# Patient Record
Sex: FEMALE | Race: WHITE | Employment: PART TIME | URBAN - METROPOLITAN AREA
[De-identification: names, ages, dates, MRNs, and addresses within clinical notes are randomized per-mention and may not be internally consistent; named-entity substitution may affect disease eponyms.]

---

## 2022-12-07 ENCOUNTER — HOSPITAL ENCOUNTER (EMERGENCY)
Age: 22
Discharge: HOME | End: 2022-12-08
Payer: COMMERCIAL

## 2022-12-07 VITALS
SYSTOLIC BLOOD PRESSURE: 131 MMHG | WEIGHT: 148 LBS | TEMPERATURE: 98.2 F | OXYGEN SATURATION: 96 % | HEART RATE: 108 BPM | RESPIRATION RATE: 18 BRPM | DIASTOLIC BLOOD PRESSURE: 83 MMHG

## 2022-12-07 DIAGNOSIS — J45.909 UNCOMPLICATED ASTHMA, UNSPECIFIED ASTHMA SEVERITY, UNSPECIFIED WHETHER PERSISTENT: ICD-10-CM

## 2022-12-07 DIAGNOSIS — J10.1 INFLUENZA A: Primary | ICD-10-CM

## 2022-12-07 LAB
INFLUENZA A BY PCR: POSITIVE
INFLUENZA B BY PCR: NEGATIVE
SARS-COV-2, NAAT: NOT DETECTED

## 2022-12-07 PROCEDURE — 87635 SARS-COV-2 COVID-19 AMP PRB: CPT

## 2022-12-07 PROCEDURE — 96372 THER/PROPH/DIAG INJ SC/IM: CPT

## 2022-12-07 PROCEDURE — 6370000000 HC RX 637 (ALT 250 FOR IP)

## 2022-12-07 PROCEDURE — 6360000002 HC RX W HCPCS

## 2022-12-07 PROCEDURE — 99284 EMERGENCY DEPT VISIT MOD MDM: CPT

## 2022-12-07 PROCEDURE — 87502 INFLUENZA DNA AMP PROBE: CPT

## 2022-12-07 RX ORDER — ACETAMINOPHEN 500 MG
500 TABLET ORAL EVERY 6 HOURS PRN
Qty: 120 TABLET | Refills: 3 | Status: SHIPPED | OUTPATIENT
Start: 2022-12-07

## 2022-12-07 RX ORDER — ACETAMINOPHEN 325 MG/1
650 TABLET ORAL ONCE
Status: COMPLETED | OUTPATIENT
Start: 2022-12-07 | End: 2022-12-07

## 2022-12-07 RX ORDER — NAPROXEN 500 MG/1
500 TABLET ORAL 2 TIMES DAILY WITH MEALS
Qty: 60 TABLET | Refills: 5 | Status: SHIPPED | OUTPATIENT
Start: 2022-12-07

## 2022-12-07 RX ORDER — GUAIFENESIN 600 MG/1
600 TABLET, EXTENDED RELEASE ORAL 2 TIMES DAILY
Status: DISCONTINUED | OUTPATIENT
Start: 2022-12-07 | End: 2022-12-08 | Stop reason: HOSPADM

## 2022-12-07 RX ORDER — GUAIFENESIN 600 MG/1
600 TABLET, EXTENDED RELEASE ORAL 2 TIMES DAILY
Qty: 30 TABLET | Refills: 0 | Status: SHIPPED | OUTPATIENT
Start: 2022-12-07 | End: 2022-12-22

## 2022-12-07 RX ORDER — METHYLPREDNISOLONE SODIUM SUCCINATE 125 MG/2ML
60 INJECTION, POWDER, LYOPHILIZED, FOR SOLUTION INTRAMUSCULAR; INTRAVENOUS ONCE
Status: COMPLETED | OUTPATIENT
Start: 2022-12-07 | End: 2022-12-07

## 2022-12-07 RX ORDER — BENZONATATE 100 MG/1
100 CAPSULE ORAL 3 TIMES DAILY PRN
Qty: 30 CAPSULE | Refills: 0 | Status: SHIPPED | OUTPATIENT
Start: 2022-12-07 | End: 2022-12-14

## 2022-12-07 RX ORDER — BENZONATATE 100 MG/1
100 CAPSULE ORAL ONCE
Status: COMPLETED | OUTPATIENT
Start: 2022-12-07 | End: 2022-12-07

## 2022-12-07 RX ORDER — NAPROXEN 500 MG/1
500 TABLET ORAL ONCE
Status: COMPLETED | OUTPATIENT
Start: 2022-12-07 | End: 2022-12-07

## 2022-12-07 RX ADMIN — METHYLPREDNISOLONE SODIUM SUCCINATE 60 MG: 125 INJECTION, POWDER, FOR SOLUTION INTRAMUSCULAR; INTRAVENOUS at 22:53

## 2022-12-07 RX ADMIN — NAPROXEN 500 MG: 500 TABLET ORAL at 22:57

## 2022-12-07 RX ADMIN — BENZONATATE 100 MG: 100 CAPSULE ORAL at 22:49

## 2022-12-07 RX ADMIN — ACETAMINOPHEN 650 MG: 325 TABLET ORAL at 22:49

## 2022-12-07 RX ADMIN — GUAIFENESIN 600 MG: 600 TABLET ORAL at 22:49

## 2022-12-07 ASSESSMENT — ENCOUNTER SYMPTOMS
SHORTNESS OF BREATH: 0
VOMITING: 0
ABDOMINAL PAIN: 0
DIARRHEA: 0
NAUSEA: 0
SINUS PRESSURE: 1
PHOTOPHOBIA: 0
COUGH: 0
SINUS PAIN: 1

## 2022-12-08 NOTE — ED PROVIDER NOTES
[address] [facility] ED  eMERGENCY dEPARTMENT eNCOUnter      Pt Name: Naye Farris  MRN: [id number]  [place] 2000  Date of evaluation: 12/7/2022  Provider: KARTHIK Jenkins        HISTORY OF PRESENT ILLNESS    Naye Farris is a 25 y.o. female per chart review has ah/o asthma. Presents to the emergency room with several hour history of sinus pressure, sinus congestion, rhinorrhea, cough, headache. Patient was recently exposed to a family member with influenza. Denies shortness of breath or wheezing. Denies increased need for inhaler use. Denies recent fever or chills. No nausea vomiting or diarrhea. She states she is completing Keflex for a UTI at this time and has felt her urinary symptoms are improving. Still has intermittent flank pain but remains tolerating the antibiotic well. Also reports ongoing brain fog for several months and states she has been referred to neurology in the past but has not yet had the chance to follow-up. No new or focal deficits reported. REVIEW OF SYSTEMS       Review of Systems   Constitutional:  Negative for chills and fever. HENT:  Positive for sinus pressure and sinus pain. Negative for congestion. Eyes:  Negative for photophobia. Respiratory:  Negative for cough and shortness of breath. Cardiovascular:  Negative for chest pain. Gastrointestinal:  Negative for abdominal pain, diarrhea, nausea and vomiting. Genitourinary:  Negative for difficulty urinating. Musculoskeletal:  Negative for myalgias. Neurological:  Negative for headaches. Psychiatric/Behavioral:  Negative for confusion. Except as noted above the remainder of the review of systems was reviewed and negative. PAST MEDICAL HISTORY   No past medical history on file. SURGICAL HISTORY     No past surgical history on file.       CURRENT MEDICATIONS       Discharge Medication List as of 12/7/2022 11:41 PM          ALLERGIES     Patient has no known allergies. FAMILY HISTORY     No family history on file. SOCIAL HISTORY       Social History     Socioeconomic History    Marital status: Single         PHYSICAL EXAM        ED Triage Vitals   BP Temp Temp Source Heart Rate Resp SpO2 Height Weight   12/07/22 2111 12/07/22 2109 12/07/22 2109 12/07/22 2109 12/07/22 2109 12/07/22 2109 -- 12/07/22 2109   131/83 98.2 °F (36.8 °C) Temporal (!) 108 18 96 %  148 lb (67.1 kg)       Physical Exam  Constitutional:       General: She is not in acute distress. Appearance: Normal appearance. She is not ill-appearing, toxic-appearing or diaphoretic. HENT:      Head: Normocephalic and atraumatic. Right Ear: External ear normal.      Left Ear: External ear normal.      Nose: Congestion and rhinorrhea present. Mouth/Throat:      Mouth: Mucous membranes are moist.      Pharynx: Oropharynx is clear. No oropharyngeal exudate or posterior oropharyngeal erythema. Eyes:      Extraocular Movements: Extraocular movements intact. Conjunctiva/sclera: Conjunctivae normal.      Pupils: Pupils are equal, round, and reactive to light. Cardiovascular:      Rate and Rhythm: Normal rate and regular rhythm. Pulmonary:      Effort: Pulmonary effort is normal. No respiratory distress. Breath sounds: Normal breath sounds. No stridor. No wheezing, rhonchi or rales. Comments: L CTA B  Chest:      Chest wall: No tenderness. Abdominal:      General: Bowel sounds are normal. There is no distension. Palpations: Abdomen is soft. There is no mass. Tenderness: There is no abdominal tenderness. There is no right CVA tenderness, left CVA tenderness, guarding or rebound. Hernia: No hernia is present. Comments: Soft nontender nonrigid   Musculoskeletal:         General: Normal range of motion. Cervical back: Normal range of motion. Right lower leg: No edema. Left lower leg: No edema. Skin:     General: Skin is warm.       Capillary Refill: Capillary refill takes less than 2 seconds. Coloration: Skin is not pale. Findings: No lesion or rash. Neurological:      Mental Status: She is alert and oriented to person, place, and time. Psychiatric:         Mood and Affect: Mood normal.         Behavior: Behavior normal.         LABS:  Labs Reviewed   RAPID INFLUENZA A/B ANTIGENS - Abnormal; Notable for the following components:       Result Value    Influenza A by PCR POSITIVE (*)     All other components within normal limits   COVID-19, RAPID         MDM:   Vitals:    Vitals:    12/07/22 2109 12/07/22 2111   BP:  131/83   Pulse: (!) 108    Resp: 18    Temp: 98.2 °F (36.8 °C)    TempSrc: Temporal    SpO2: 96%    Weight: 148 lb (67.1 kg)        25year-old patient presents emergency department with concern for upper respiratory infectious complaints. Recent exposure to influenza. She is influenza A positive in the ED. She is denying shortness of breath or asthma symptoms. On exam her lungs are completely clear with good air movement to all fields. She is not in any distress having any increased work of breathing or hypoxic. Not consistent with asthma exacerbation. Given symptom management in the emergency department. She is also discussing that she was recently diagnosed with a UTI and wondering if she should continue taking her Keflex as her symptoms have been feeling better and she is educated to continue. Also reporting ongoing several month history of intermittent \"brain fog\" issues that she has been referred to neurology for and strongly encouraged to keep this follow-up or return for repeat evaluation for specific worsening symptoms. She is agreeable to discharge states she is feeling well given symptom management for home. CRITICAL CARE TIME   Total CriticalCare time was 0 minutes, excluding separately reportable procedures.   There was a high probability of clinically significant/life threatening deterioration in the patient's condition which required my urgent intervention. PROCEDURES:  Unlessotherwise noted below, none      Procedures      FINAL IMPRESSION      1. Influenza A    2.  Uncomplicated asthma, unspecified asthma severity, unspecified whether persistent          DISPOSITION/PLAN   DISPOSITION Decision To Discharge 12/07/2022 11:40:28 PM          KARTHIK Kim (electronically signed)  Attending Emergency Physician          Michael Flaherty, [address]  12/08/22 [id number]

## 2024-07-16 ENCOUNTER — APPOINTMENT (OUTPATIENT)
Dept: OBSTETRICS AND GYNECOLOGY | Facility: CLINIC | Age: 24
End: 2024-07-16
Payer: COMMERCIAL

## 2024-07-16 ENCOUNTER — LAB (OUTPATIENT)
Dept: LAB | Facility: LAB | Age: 24
End: 2024-07-16
Payer: COMMERCIAL

## 2024-07-16 VITALS
SYSTOLIC BLOOD PRESSURE: 129 MMHG | BODY MASS INDEX: 33.14 KG/M2 | WEIGHT: 168.8 LBS | HEIGHT: 60 IN | DIASTOLIC BLOOD PRESSURE: 84 MMHG

## 2024-07-16 DIAGNOSIS — N92.0 UNUSUALLY FREQUENT MENSES: ICD-10-CM

## 2024-07-16 DIAGNOSIS — Z11.3 SCREENING EXAMINATION FOR STI: ICD-10-CM

## 2024-07-16 DIAGNOSIS — Z01.419 WELL WOMAN EXAM WITH ROUTINE GYNECOLOGICAL EXAM: Primary | ICD-10-CM

## 2024-07-16 DIAGNOSIS — R53.83 OTHER FATIGUE: ICD-10-CM

## 2024-07-16 DIAGNOSIS — R63.5 WEIGHT GAIN: ICD-10-CM

## 2024-07-16 DIAGNOSIS — N94.10 DYSPAREUNIA, FEMALE: ICD-10-CM

## 2024-07-16 LAB
HCG UR QL IA.RAPID: NEGATIVE
TSH SERPL-ACNC: 1.8 MIU/L (ref 0.44–3.98)

## 2024-07-16 PROCEDURE — 83001 ASSAY OF GONADOTROPIN (FSH): CPT

## 2024-07-16 PROCEDURE — 87340 HEPATITIS B SURFACE AG IA: CPT

## 2024-07-16 PROCEDURE — 84146 ASSAY OF PROLACTIN: CPT

## 2024-07-16 PROCEDURE — 1036F TOBACCO NON-USER: CPT

## 2024-07-16 PROCEDURE — 86803 HEPATITIS C AB TEST: CPT

## 2024-07-16 PROCEDURE — 87491 CHLMYD TRACH DNA AMP PROBE: CPT

## 2024-07-16 PROCEDURE — 36415 COLL VENOUS BLD VENIPUNCTURE: CPT

## 2024-07-16 PROCEDURE — 87389 HIV-1 AG W/HIV-1&-2 AB AG IA: CPT

## 2024-07-16 PROCEDURE — 99395 PREV VISIT EST AGE 18-39: CPT

## 2024-07-16 PROCEDURE — 84443 ASSAY THYROID STIM HORMONE: CPT

## 2024-07-16 PROCEDURE — 81025 URINE PREGNANCY TEST: CPT

## 2024-07-16 PROCEDURE — 86780 TREPONEMA PALLIDUM: CPT

## 2024-07-16 PROCEDURE — 84402 ASSAY OF FREE TESTOSTERONE: CPT

## 2024-07-16 PROCEDURE — 87591 N.GONORRHOEAE DNA AMP PROB: CPT

## 2024-07-16 PROCEDURE — 3008F BODY MASS INDEX DOCD: CPT

## 2024-07-16 PROCEDURE — 83002 ASSAY OF GONADOTROPIN (LH): CPT

## 2024-07-16 PROCEDURE — 87661 TRICHOMONAS VAGINALIS AMPLIF: CPT

## 2024-07-16 ASSESSMENT — PATIENT HEALTH QUESTIONNAIRE - PHQ9
1. LITTLE INTEREST OR PLEASURE IN DOING THINGS: NOT AT ALL
SUM OF ALL RESPONSES TO PHQ9 QUESTIONS 1 & 2: 0
2. FEELING DOWN, DEPRESSED OR HOPELESS: NOT AT ALL

## 2024-07-16 NOTE — PROGRESS NOTES
Subjective   Isis Paniagua is a 23 y.o. female who is here for a routine exam. No vaginal concerns at this time including abnormal discharge, odor or discomfort. She is sexually active with one partner and has no concern for STI exposure. She denies bladder or bowel concerns.    Patient has concerns for dyspareunia with initial insertion and tearing at opening.     Patient also has concerns for weight gain and fatigue for roughly over a year.   Patient also reports concerns for frequent menstrual cycles over the last two months where she is experiencing two menses a month. Patient reports cycles are every 16 days. Patient reports being on OCPS in the past and stopping use in 3/2023 due to thinking her medication was causing weight gain and fatigue. Patient denies any new onset life stressors.      Current contraception: none  LMP: 7/12/2024  Menses: irregular every 16 or so days over the last two month  Last pap: N/A  History of abnormal Pap smear: no  Due for pap: yes -    Family history of uterine or ovarian cancer: no  Family history of prostate cancer: no  Family history of pancreatic cancer: no  Family hx of BRCA1/BRCA2: no  Family history of breast cancer: yes, maternal aunt at age 50  Last mammogram: N/A  Gardasil: N/A      OB History    No obstetric history on file.         Review of Systems    Objective   There were no vitals taken for this visit.    Physical Exam  HENT:      Mouth/Throat:      Mouth: Mucous membranes are moist.   Eyes:      Conjunctiva/sclera: Conjunctivae normal.   Neck:      Thyroid: No thyroid mass, thyromegaly or thyroid tenderness.   Cardiovascular:      Rate and Rhythm: Normal rate and regular rhythm.      Pulses: Normal pulses.   Pulmonary:      Effort: Pulmonary effort is normal.      Breath sounds: Normal breath sounds.   Chest:   Breasts:     Breasts are symmetrical.      Right: Normal.      Left: Normal.   Abdominal:      General: Abdomen is flat.      Palpations: Abdomen is  soft.      Hernia: There is no hernia in the left inguinal area or right inguinal area.   Genitourinary:     General: Normal vulva.      Vagina: Normal.      Cervix: Normal.      Uterus: Normal.       Adnexa: Right adnexa normal and left adnexa normal.      Comments: Pap collected  Musculoskeletal:         General: Normal range of motion.      Cervical back: Normal range of motion.   Lymphadenopathy:      Cervical: No cervical adenopathy.      Right cervical: No superficial, deep or posterior cervical adenopathy.     Left cervical: No superficial, deep or posterior cervical adenopathy.      Lower Body: No right inguinal adenopathy. No left inguinal adenopathy.   Skin:     General: Skin is warm.      Capillary Refill: Capillary refill takes less than 2 seconds.   Neurological:      Mental Status: She is alert and oriented to person, place, and time.   Psychiatric:         Mood and Affect: Mood normal.         Behavior: Behavior normal.          Assessment/Plan   A&P --> 23 y.o. y.o woman for annual GYN exam.     - Health Maintenance -->  - Routine follow up with PCP for health maintenance examination encouraged, including TSH, cholesterol, and Vit. D evaluation.  Self breast awareness encouraged; concerning characteristics of breasts reviewed - Pt. will report general concerns, any adherent lumps, skin dimpling/puckering or color changes, and any nipple discharge.    Diet and exercise reviewed.     Pap today and if wnl, next pap in 3 years:- Reviewed ACOG and ASCCP guidelines with pt.     Dyspareunia: discussed option for pelvic floor PT. Patient declines at this time. Discussed option of vaginal dilator use and use of high quality silicone based lubricants to improve symptoms.        - STD Screening: gc/ct and trich off pap and hep b and c, syphilis, and HIV orders placed.      - Contraception Plan: none     - Fatigue and weight gain- tsh ordered.      - AUB: Frequent menses: dicussed potential causes of AUB with  patient including stress and hormonal imbalances: TSH, prolactin, fsh, lh, and testosterone ordered. Discussed management of menses with contraception. Patient would like to await lab results prior to management.      - F/U 1 year or as needed.    REYES Joy-ALEX

## 2024-07-17 LAB
C TRACH RRNA SPEC QL NAA+PROBE: NEGATIVE
FSH SERPL-ACNC: 7.4 IU/L
HBV SURFACE AG SERPL QL IA: NONREACTIVE
HCV AB SER QL: NONREACTIVE
HIV 1+2 AB+HIV1 P24 AG SERPL QL IA: NONREACTIVE
LH SERPL-ACNC: 3.8 IU/L
N GONORRHOEA DNA SPEC QL PROBE+SIG AMP: NEGATIVE
PROLACTIN SERPL-MCNC: 7.3 UG/L (ref 3–20)
T VAGINALIS RRNA SPEC QL NAA+PROBE: NEGATIVE
TREPONEMA PALLIDUM IGG+IGM AB [PRESENCE] IN SERUM OR PLASMA BY IMMUNOASSAY: NONREACTIVE

## 2024-07-21 LAB
TESTOSTERONE FREE (CHAN): 2.2 PG/ML (ref 0.1–6.4)
TESTOSTERONE,TOTAL,LC-MS/MS: 22 NG/DL (ref 2–45)

## 2024-07-22 DIAGNOSIS — N93.9 ABNORMAL UTERINE BLEEDING (AUB): Primary | ICD-10-CM

## 2024-07-24 LAB
CYTOLOGY CMNT CVX/VAG CYTO-IMP: NORMAL
LAB AP HPV GENOTYPE QUESTION: YES
LAB AP HPV HR: NORMAL
LAB AP PAP ADDITIONAL TESTS: NORMAL
LABORATORY COMMENT REPORT: NORMAL
LABORATORY COMMENT REPORT: NORMAL
PATH REPORT.TOTAL CANCER: NORMAL

## 2024-07-26 ENCOUNTER — HOSPITAL ENCOUNTER (OUTPATIENT)
Dept: RADIOLOGY | Facility: HOSPITAL | Age: 24
Discharge: HOME | End: 2024-07-26
Payer: COMMERCIAL

## 2024-07-26 ENCOUNTER — ANCILLARY ORDERS (OUTPATIENT)
Dept: OBSTETRICS AND GYNECOLOGY | Facility: HOSPITAL | Age: 24
End: 2024-07-26

## 2024-07-26 DIAGNOSIS — N93.9 ABNORMAL UTERINE BLEEDING (AUB): ICD-10-CM

## 2024-07-26 DIAGNOSIS — N93.9 ABNORMAL UTERINE BLEEDING (AUB): Primary | ICD-10-CM

## 2024-07-26 PROCEDURE — 76856 US EXAM PELVIC COMPLETE: CPT

## 2024-08-20 ENCOUNTER — APPOINTMENT (OUTPATIENT)
Dept: PRIMARY CARE | Facility: CLINIC | Age: 24
End: 2024-08-20
Payer: COMMERCIAL